# Patient Record
Sex: FEMALE | Race: BLACK OR AFRICAN AMERICAN | Employment: FULL TIME
[De-identification: names, ages, dates, MRNs, and addresses within clinical notes are randomized per-mention and may not be internally consistent; named-entity substitution may affect disease eponyms.]

---

## 2022-08-19 ENCOUNTER — NURSE TRIAGE (OUTPATIENT)
Dept: OTHER | Facility: CLINIC | Age: 37
End: 2022-08-19

## 2022-08-19 NOTE — TELEPHONE ENCOUNTER
Received call from [facility] at [facility] with [employer] Complaint. Current Symptoms: Intermittent rectal bleeding when straining with BM, bright blood when wiping    Denies - black or tarry stool / diarrhea / vomiting / injury to rectum    Onset: 2 months ago;       Pain Severity: 5/10; burning; intermittent    Temperature: denies     What has been tried: preparation H      Recommended disposition: See PCP within 3 Days Patient does not have PCP and was advised to go to an [facility] for current problem. Care advice provided, patient verbalizes understanding; denies any other questions or concerns; instructed to call back for any new or worsening symptoms. Patient/Caller agrees with recommended disposition; writer provided warm transfer to [facility] at [facility] for appointment scheduling  For new patient appointment scheduling. Attention Provider: Thank you for allowing me to participate in the care of your patient. The patient was connected to triage in response to information provided to the ECC. Please do not respond through this encounter as the response is not directed to a shared pool.         Reason for Disposition   MILD rectal bleeding (more than just a few drops or streaks)    Protocols used: Rectal Bleeding-ADULT-[place]